# Patient Record
Sex: FEMALE | Race: ASIAN | Employment: PART TIME | URBAN - METROPOLITAN AREA
[De-identification: names, ages, dates, MRNs, and addresses within clinical notes are randomized per-mention and may not be internally consistent; named-entity substitution may affect disease eponyms.]

---

## 2017-04-24 ENCOUNTER — OFFICE VISIT (OUTPATIENT)
Dept: INTERNAL MEDICINE CLINIC | Facility: CLINIC | Age: 22
End: 2017-04-24

## 2017-04-24 VITALS
SYSTOLIC BLOOD PRESSURE: 110 MMHG | DIASTOLIC BLOOD PRESSURE: 70 MMHG | RESPIRATION RATE: 12 BRPM | BODY MASS INDEX: 25.3 KG/M2 | OXYGEN SATURATION: 99 % | HEIGHT: 62.75 IN | WEIGHT: 141 LBS | HEART RATE: 90 BPM

## 2017-04-24 DIAGNOSIS — Z71.84 TRAVEL ADVICE ENCOUNTER: ICD-10-CM

## 2017-04-24 DIAGNOSIS — G43.009 MIGRAINE WITHOUT AURA AND WITHOUT STATUS MIGRAINOSUS, NOT INTRACTABLE: Primary | ICD-10-CM

## 2017-04-24 PROCEDURE — 99214 OFFICE O/P EST MOD 30 MIN: CPT | Performed by: INTERNAL MEDICINE

## 2017-04-24 PROCEDURE — 90715 TDAP VACCINE 7 YRS/> IM: CPT | Performed by: INTERNAL MEDICINE

## 2017-04-24 PROCEDURE — 90471 IMMUNIZATION ADMIN: CPT | Performed by: INTERNAL MEDICINE

## 2017-04-24 RX ORDER — RIZATRIPTAN BENZOATE 10 MG/1
TABLET ORAL
Qty: 9 TABLET | Refills: 1 | Status: SHIPPED | OUTPATIENT
Start: 2017-04-24 | End: 2018-09-18 | Stop reason: ALTCHOICE

## 2017-04-24 NOTE — PROGRESS NOTES
Coral Allen is a 19year old female.   HPI:   CC: headache back of neck to top feels like helmet   None now  Occurred 2 weeks ago and this past week  Light bothers her  Switched to generic two months ago  Noticed headache when sleeping  too long  [place] injected, TM intact no erythema, oropharynx clear, posterior pharynx without erythema, clear postnasal drip, nasal turbinates swollen  NECK: supple,no masses,  LYMPH: no cervical adenopathy,  CARDIO: [id number] no S3S4 no murmur  LUNGS: clear to auscultation,

## 2017-05-16 ENCOUNTER — TELEPHONE (OUTPATIENT)
Dept: INTERNAL MEDICINE CLINIC | Facility: CLINIC | Age: 22
End: 2017-05-16

## 2017-05-16 NOTE — TELEPHONE ENCOUNTER
Mother, on hipaa consent, left message asking which vaccines pt has had. Tdap (tetanus, diptheria, pertussis) on 4/24/17. No there vaccines on file. LM for mother to call back.

## 2017-05-16 NOTE — TELEPHONE ENCOUNTER
Patient's mother called and stated Dr. Licona Agent told her to call for Malaria pills when they were ready to travel.   Need to call rx to [facility]

## 2017-05-17 RX ORDER — ATOVAQUONE AND PROGUANIL HYDROCHLORIDE 250; 100 MG/1; MG/1
1 TABLET, FILM COATED ORAL DAILY
Qty: 32 TABLET | Refills: 0 | Status: SHIPPED | OUTPATIENT
Start: 2017-05-17 | End: 2017-06-18

## 2017-05-17 NOTE — TELEPHONE ENCOUNTER
Spoke with pt's mother. Advised as below with immunization update. Pt's mother voiced understanding.

## 2017-05-17 NOTE — TELEPHONE ENCOUNTER
Yes, please prescribe malarone and ask how long she will be gone b/c depending on that she will start 1-2 days prior to trip and continue for 1 week after she returns.

## 2017-05-17 NOTE — TELEPHONE ENCOUNTER
Spoke with pt's mother, Fernando Jorge, (OK per HIPAA). Advised as below that Malarone will be ordered to be taken daily staring 2 days prior to leaving for trip, daily during trip, and daily for one week after returning form trip.  Pt's mother voiced Raymon Kearns

## 2017-06-13 ENCOUNTER — TELEPHONE (OUTPATIENT)
Dept: INTERNAL MEDICINE CLINIC | Facility: CLINIC | Age: 22
End: 2017-06-13

## 2017-06-13 NOTE — TELEPHONE ENCOUNTER
As advised per nurse  If diarrhea not controlled, need to see clinic doctor  Stay on clear liquids stay hydrated

## 2017-06-13 NOTE — TELEPHONE ENCOUNTER
Patient's father called and states patient is in the [place] and has gastro problem.     Wants to speak to a nurse to decide how to proceed

## 2017-06-13 NOTE — TELEPHONE ENCOUNTER
Spoke with father who is on hipaa release. Pt in [facility] 67 with mother. Took water bottle with filter, has been drinking water out of that. Starting yesterday, diarrheam nausea, weakness, dizziness. Has Pepto but is not helping.  [place]

## 2017-06-13 NOTE — TELEPHONE ENCOUNTER
Spoke with father, advised of Dr Sesay's recommendation re-inforcing what was stated earlier. He had spoken to pt's mother, they decided to not leave for [facility] today so pt can rest, will go to clinic in that area if still with symptoms tomorrow.

## 2017-06-22 ENCOUNTER — TELEPHONE (OUTPATIENT)
Dept: INTERNAL MEDICINE CLINIC | Facility: CLINIC | Age: 22
End: 2017-06-22

## 2017-06-22 NOTE — TELEPHONE ENCOUNTER
Received call from pt's father (OK Per HIPAA). Father states that pt has been in [address] [place] [address] since 6/5/17. States that pt stated that she started having diarrhea 6/8/14 or 6/9/17 along with upset stomach and dizziness.  Father states that pt states that ot

## 2017-07-07 ENCOUNTER — DIAGNOSTIC TRANS (OUTPATIENT)
Dept: OTHER | Age: 22
End: 2017-07-07

## 2017-07-07 ENCOUNTER — CHARTING TRANS (OUTPATIENT)
Dept: OTHER | Age: 22
End: 2017-07-07

## 2017-08-10 ENCOUNTER — DIAGNOSTIC TRANS (OUTPATIENT)
Dept: OTHER | Age: 22
End: 2017-08-10

## 2017-08-25 RX ORDER — NORGESTIMATE AND ETHINYL ESTRADIOL 7DAYSX3 28
KIT ORAL
Qty: 28 TABLET | Refills: 5 | Status: SHIPPED | OUTPATIENT
Start: 2017-08-25 | End: 2018-02-04

## 2017-09-12 ENCOUNTER — OFFICE VISIT (OUTPATIENT)
Dept: INTERNAL MEDICINE CLINIC | Facility: CLINIC | Age: 22
End: 2017-09-12

## 2017-09-12 VITALS
OXYGEN SATURATION: 99 % | HEIGHT: 62.75 IN | HEART RATE: 60 BPM | DIASTOLIC BLOOD PRESSURE: 80 MMHG | SYSTOLIC BLOOD PRESSURE: 120 MMHG | WEIGHT: 137 LBS | BODY MASS INDEX: 24.58 KG/M2

## 2017-09-12 DIAGNOSIS — Z11.3 SCREEN FOR STD (SEXUALLY TRANSMITTED DISEASE): ICD-10-CM

## 2017-09-12 DIAGNOSIS — Z01.419 PAP SMEAR, AS PART OF ROUTINE GYNECOLOGICAL EXAMINATION: ICD-10-CM

## 2017-09-12 DIAGNOSIS — Z00.00 ROUTINE PHYSICAL EXAMINATION: Primary | ICD-10-CM

## 2017-09-12 PROCEDURE — 87591 N.GONORRHOEAE DNA AMP PROB: CPT | Performed by: INTERNAL MEDICINE

## 2017-09-12 PROCEDURE — 99395 PREV VISIT EST AGE 18-39: CPT | Performed by: INTERNAL MEDICINE

## 2017-09-12 PROCEDURE — 88175 CYTOPATH C/V AUTO FLUID REDO: CPT | Performed by: INTERNAL MEDICINE

## 2017-09-12 PROCEDURE — 87491 CHLMYD TRACH DNA AMP PROBE: CPT | Performed by: INTERNAL MEDICINE

## 2017-09-12 NOTE — PROGRESS NOTES
HPI:   Kira Aguirre is a 19year old female who presents for a complete physical exam.    Wt Readings from Last 6 Encounters:  09/12/17 : 137 lb  04/24/17 : 141 lb  08/31/16 : 135 lb  08/13/15 : 130 lb 9.6 oz  03/23/15 : 127 lb  07/29/14 : 130 lb    Edyta Pinzon otherwise  SKIN: no rash  EYES:denies blurred vision or double vision  HEENT: denies nasal congestion, sinus pain or ST  LUNGS: denies shortness of breath with exertion  CARDIOVASCULAR: denies chest pain on exertion, no heart racing  GI: denies abdominal p will check fasting Lipids, CMP, and TSH. Pt' s weight is Body mass index is 24.46 kg/m². , recommended low fat diet and aerobic exercise 30 minutes three times weekly. The patient indicates understanding of these issues and agrees to the plan.   The patient

## 2017-09-13 LAB
C TRACH DNA SPEC QL NAA+PROBE: NEGATIVE
N GONORRHOEA DNA SPEC QL NAA+PROBE: NEGATIVE

## 2017-09-26 ENCOUNTER — LAB ENCOUNTER (OUTPATIENT)
Dept: LAB | Age: 22
End: 2017-09-26
Attending: INTERNAL MEDICINE
Payer: COMMERCIAL

## 2017-09-26 DIAGNOSIS — Z00.00 ROUTINE PHYSICAL EXAMINATION: ICD-10-CM

## 2017-09-26 LAB
ALBUMIN SERPL-MCNC: 3.6 G/DL (ref 3.5–4.8)
ALP LIVER SERPL-CCNC: 54 U/L (ref 37–98)
ALT SERPL-CCNC: 21 U/L (ref 14–54)
AST SERPL-CCNC: 23 U/L (ref 15–41)
BASOPHILS # BLD AUTO: 0.03 X10(3) UL (ref 0–0.1)
BASOPHILS NFR BLD AUTO: 0.4 %
BILIRUB SERPL-MCNC: 0.7 MG/DL (ref 0.1–2)
BUN BLD-MCNC: 10 MG/DL (ref 8–20)
CALCIUM BLD-MCNC: 8.5 MG/DL (ref 8.3–10.3)
CHLORIDE: 106 MMOL/L (ref 101–111)
CHOLEST SMN-MCNC: 169 MG/DL (ref ?–190)
CO2: 25 MMOL/L (ref 22–32)
CREAT BLD-MCNC: 0.82 MG/DL (ref 0.55–1.02)
EOSINOPHIL # BLD AUTO: 0.17 X10(3) UL (ref 0–0.3)
EOSINOPHIL NFR BLD AUTO: 2.2 %
ERYTHROCYTE [DISTWIDTH] IN BLOOD BY AUTOMATED COUNT: 13.2 % (ref 11.5–16)
FREE T4: 1 NG/DL (ref 0.9–1.8)
GLUCOSE BLD-MCNC: 66 MG/DL (ref 70–99)
HCT VFR BLD AUTO: 44.4 % (ref 34–50)
HDLC SERPL-MCNC: 63 MG/DL (ref 45–?)
HDLC SERPL: 2.68 {RATIO} (ref ?–4.44)
HGB BLD-MCNC: 14.2 G/DL (ref 12–16)
IMMATURE GRANULOCYTE COUNT: 0.02 X10(3) UL (ref 0–1)
IMMATURE GRANULOCYTE RATIO %: 0.3 %
LDLC SERPL CALC-MCNC: 72 MG/DL (ref ?–120)
LDLC SERPL-MCNC: 34 MG/DL (ref 5–40)
LYMPHOCYTES # BLD AUTO: 2.76 X10(3) UL (ref 0.9–4)
LYMPHOCYTES NFR BLD AUTO: 35.2 %
M PROTEIN MFR SERPL ELPH: 7.1 G/DL (ref 6.1–8.3)
MCH RBC QN AUTO: 29.5 PG (ref 27–33.2)
MCHC RBC AUTO-ENTMCNC: 32 G/DL (ref 31–37)
MCV RBC AUTO: 92.1 FL (ref 81–100)
MONOCYTES # BLD AUTO: 0.51 X10(3) UL (ref 0.1–0.6)
MONOCYTES NFR BLD AUTO: 6.5 %
NEUTROPHIL ABS PRELIM: 4.35 X10 (3) UL (ref 1.3–6.7)
NEUTROPHILS # BLD AUTO: 4.35 X10(3) UL (ref 1.3–6.7)
NEUTROPHILS NFR BLD AUTO: 55.4 %
NONHDLC SERPL-MCNC: 106 MG/DL (ref ?–150)
PLATELET # BLD AUTO: 214 10(3)UL (ref 150–450)
POTASSIUM SERPL-SCNC: 4.1 MMOL/L (ref 3.6–5.1)
RBC # BLD AUTO: 4.82 X10(6)UL (ref 3.8–5.1)
RED CELL DISTRIBUTION WIDTH-SD: 44.8 FL (ref 35.1–46.3)
SODIUM SERPL-SCNC: 140 MMOL/L (ref 136–144)
TRIGLYCERIDES: 172 MG/DL (ref ?–115)
TSI SER-ACNC: 2.22 MIU/ML (ref 0.35–5.5)
WBC # BLD AUTO: 7.8 X10(3) UL (ref 4–13)

## 2017-09-26 PROCEDURE — 80061 LIPID PANEL: CPT

## 2017-09-26 PROCEDURE — 80053 COMPREHEN METABOLIC PANEL: CPT

## 2017-09-26 PROCEDURE — 84439 ASSAY OF FREE THYROXINE: CPT

## 2017-09-26 PROCEDURE — 84443 ASSAY THYROID STIM HORMONE: CPT

## 2017-09-26 PROCEDURE — 36415 COLL VENOUS BLD VENIPUNCTURE: CPT

## 2017-09-26 PROCEDURE — 85025 COMPLETE CBC W/AUTO DIFF WBC: CPT

## 2018-02-05 RX ORDER — NORGESTIMATE-ETHINYL ESTRADIOL 7DAYSX3 28
TABLET ORAL
Qty: 28 TABLET | Refills: 4 | Status: SHIPPED | OUTPATIENT
Start: 2018-02-05 | End: 2018-06-22

## 2018-03-08 ENCOUNTER — OFFICE VISIT (OUTPATIENT)
Dept: INTERNAL MEDICINE CLINIC | Facility: CLINIC | Age: 23
End: 2018-03-08

## 2018-03-08 VITALS
TEMPERATURE: 97 F | HEIGHT: 62.75 IN | RESPIRATION RATE: 12 BRPM | WEIGHT: 133 LBS | DIASTOLIC BLOOD PRESSURE: 60 MMHG | OXYGEN SATURATION: 98 % | SYSTOLIC BLOOD PRESSURE: 100 MMHG | HEART RATE: 72 BPM | BODY MASS INDEX: 23.86 KG/M2

## 2018-03-08 DIAGNOSIS — K58.0 IRRITABLE BOWEL SYNDROME WITH DIARRHEA: Primary | ICD-10-CM

## 2018-03-08 PROCEDURE — 99214 OFFICE O/P EST MOD 30 MIN: CPT | Performed by: INTERNAL MEDICINE

## 2018-03-08 NOTE — PROGRESS NOTES
Amada Hall is a 23year old female.   HPI:   CC: loose stools after eating feta cheese  Three times on Saturday  Two times on Sunday  1 time on Monday Tuesday and Wednesday  Drinking water staying hydrated  No fever or chills  No abdominal pain  Goat Wt 133 lb   LMP 03/08/2018   SpO2 98%   BMI 23.75 kg/m²   GENERAL: alert no distress  SKIN: no rash   NECK: supple,no masses,  LYMPH: no cervical adenopathy  CARDIO: [id number] no S3S4 no murmur  LUNGS: clear to auscultation,   GI: Soft+BS NT ND,no masses, no

## 2018-03-08 NOTE — PATIENT INSTRUCTIONS
Diet and Lifestyle Tips for Irritable Bowel Syndrome (IBS)    Your healthcare professional may suggest some lifestyle changes to help control your IBS. Changing your diet and managing stress are two of the most important.  Follow your healthcare provider’

## 2018-06-21 ENCOUNTER — CHARTING TRANS (OUTPATIENT)
Dept: OTHER | Age: 23
End: 2018-06-21

## 2018-06-21 ENCOUNTER — TELEPHONE (OUTPATIENT)
Dept: INTERNAL MEDICINE CLINIC | Facility: CLINIC | Age: 23
End: 2018-06-21

## 2018-06-21 DIAGNOSIS — Z82.49 FAMILY HISTORY OF ARRHYTHMOGENIC RIGHT VENTRICULAR CARDIOMYOPATHY: Primary | ICD-10-CM

## 2018-06-21 NOTE — TELEPHONE ENCOUNTER
Incoming (mail or fax): fax  Received from:  [facility]  Documentation given to:  triage

## 2018-06-22 RX ORDER — NORGESTIMATE AND ETHINYL ESTRADIOL 7DAYSX3 28
KIT ORAL
Qty: 28 TABLET | Refills: 2 | Status: SHIPPED | OUTPATIENT
Start: 2018-06-22 | End: 2018-09-18

## 2018-06-22 NOTE — TELEPHONE ENCOUNTER
Referral request received from [facility]. Pt has appt with Dr Estephania García at [address] [facility], [facility] July 6th, Referral MUST state [facility]. Fax copy of referral to [phone].   Original fax request i

## 2018-06-25 NOTE — TELEPHONE ENCOUNTER
Incoming (mail or fax): Fax  Received from:  Beata Tamez [facility] office  Documentation given to: [address] fax bin.

## 2018-06-25 NOTE — TELEPHONE ENCOUNTER
Referral request. Was not pended yet in case not approved. Orig note below but copy/pasted here:    Referral request received from [facility].  Pt has appt with Dr Domonique Parra at [address] [facility],[address], [place] July 6th, Refer

## 2018-06-26 NOTE — TELEPHONE ENCOUNTER
Dr Angel Tirado is cardiologist  Referral request was received by fax as noted below, copy in triage. All pertinent details were noted in this encounter. Copy routed to Dr Kade Haines. Spoke with Louise Mehta at Dr Engle's office.  Her direct line is [phone] (for cli

## 2018-06-26 NOTE — TELEPHONE ENCOUNTER
Referral faxed to Dr Spike Arredondo office. Left msg for pt to call back. Please advise referral placed for Dr Meera Alvarado. Also need functional status questions completed/updated for referrals. Please complete questions with her. Thanks!

## 2018-06-29 NOTE — TELEPHONE ENCOUNTER
Noted below. Since  form not required, the referral has been completed and was already faxed to referral office 2x.   Left 2nd msg with pt to attempt to complete functional status questions per Dr Maxim Clements request.

## 2018-06-29 NOTE — TELEPHONE ENCOUNTER
Hiram from the referral department returning a call for [facility] regarding cardiologist referral for Dr. Adam Gay. Hiram stated the [employer] form is not required.  If you have any additional questions, Hiram can be reached at #

## 2018-06-29 NOTE — TELEPHONE ENCOUNTER
Fax received from referral dept saying referral needs to be on  form. Not sure what this is. Faxed back asking for clarification and copy of requested form.

## 2018-06-29 NOTE — TELEPHONE ENCOUNTER
Incoming (mail or fax):  fax  Received from:  [address] [facility]  Documentation given to:  triage

## 2018-07-06 ENCOUNTER — CHARTING TRANS (OUTPATIENT)
Dept: OTHER | Age: 23
End: 2018-07-06

## 2018-07-06 ENCOUNTER — DIAGNOSTIC TRANS (OUTPATIENT)
Dept: OTHER | Age: 23
End: 2018-07-06

## 2018-07-09 NOTE — TELEPHONE ENCOUNTER
FUNCTIONAL STATUS:   Do you have …     Hearing problems?:  no     Vision problems?:  no     Difficulty Walking?:  no     Difficulty dressing or bathing?:  no     Problems with daily activities?:  no     Memory issues?:  no

## 2018-07-26 ENCOUNTER — DIAGNOSTIC TRANS (OUTPATIENT)
Dept: OTHER | Age: 23
End: 2018-07-26

## 2018-09-17 RX ORDER — NORGESTIMATE AND ETHINYL ESTRADIOL 7DAYSX3 28
1 KIT ORAL
Qty: 84 TABLET | Refills: 3 | OUTPATIENT
Start: 2018-09-17

## 2018-09-17 RX ORDER — NORGESTIMATE AND ETHINYL ESTRADIOL 7DAYSX3 28
KIT ORAL
Qty: 28 TABLET | Refills: 1 | OUTPATIENT
Start: 2018-09-17

## 2018-09-18 ENCOUNTER — OFFICE VISIT (OUTPATIENT)
Dept: INTERNAL MEDICINE CLINIC | Facility: CLINIC | Age: 23
End: 2018-09-18
Payer: COMMERCIAL

## 2018-09-18 VITALS
WEIGHT: 132.63 LBS | HEART RATE: 76 BPM | TEMPERATURE: 98 F | SYSTOLIC BLOOD PRESSURE: 122 MMHG | DIASTOLIC BLOOD PRESSURE: 78 MMHG | HEIGHT: 62.75 IN | OXYGEN SATURATION: 98 % | RESPIRATION RATE: 14 BRPM | BODY MASS INDEX: 23.8 KG/M2

## 2018-09-18 DIAGNOSIS — R30.0 DYSURIA: ICD-10-CM

## 2018-09-18 DIAGNOSIS — Z13.0 SCREENING FOR BLOOD DISEASE: ICD-10-CM

## 2018-09-18 DIAGNOSIS — Z13.228 SCREENING FOR METABOLIC DISORDER: ICD-10-CM

## 2018-09-18 DIAGNOSIS — Z13.29 SCREENING FOR THYROID DISORDER: ICD-10-CM

## 2018-09-18 DIAGNOSIS — Z13.220 SCREENING FOR LIPID DISORDERS: ICD-10-CM

## 2018-09-18 DIAGNOSIS — Z00.00 WELL WOMAN EXAM (NO GYNECOLOGICAL EXAM): Primary | ICD-10-CM

## 2018-09-18 LAB
APPEARANCE: CLEAR
MULTISTIX LOT#: NORMAL NUMERIC
PH, URINE: 7.5 (ref 4.5–8)
SPECIFIC GRAVITY: 1.02 (ref 1–1.03)
URINE-COLOR: YELLOW
UROBILINOGEN,SEMI-QN: 0.2 MG/DL (ref 0–1.9)

## 2018-09-18 PROCEDURE — 99395 PREV VISIT EST AGE 18-39: CPT | Performed by: NURSE PRACTITIONER

## 2018-09-18 PROCEDURE — 90686 IIV4 VACC NO PRSV 0.5 ML IM: CPT | Performed by: NURSE PRACTITIONER

## 2018-09-18 PROCEDURE — 90471 IMMUNIZATION ADMIN: CPT | Performed by: NURSE PRACTITIONER

## 2018-09-18 PROCEDURE — 81003 URINALYSIS AUTO W/O SCOPE: CPT | Performed by: NURSE PRACTITIONER

## 2018-09-18 RX ORDER — NORGESTIMATE AND ETHINYL ESTRADIOL 7DAYSX3 28
1 KIT ORAL
Qty: 84 TABLET | Refills: 3 | Status: SHIPPED | OUTPATIENT
Start: 2018-09-18 | End: 2019-08-23

## 2018-09-18 RX ORDER — NORGESTIMATE AND ETHINYL ESTRADIOL 7DAYSX3 28
KIT ORAL
Qty: 28 TABLET | Refills: 1 | Status: CANCELLED | OUTPATIENT
Start: 2018-09-18

## 2018-09-18 NOTE — TELEPHONE ENCOUNTER
Patient was here in office today to see [facility] NALINI and requested a refill of TRI-SPRINTEC 0.18/0.215/0.25 MG-35 MCG Oral Tab. PSR looked and there has not been a refill processed. Patient is going back to the [facility] and needs to pick this up today.       P

## 2018-09-18 NOTE — PROGRESS NOTES
Sheba Martinez is a 20year old female who presents for a complete physical exam:       Patient complains of:  2-3 day history of \"very mild\" dysuria. Denies frequency, urgency, hematuria, vaginal discharge or itching.  Has not been treating with anythi per week      Comment: weekends    Drug use: No    Social History: Occ:  at [employer]. : no. Children: no.  Exercise: minimal.  Diet: watches minimally  Smoker: Yes    Cessation advised:   Yes  Alcohol Use:  yes      Concerns:  no     Health atraumatic, normocephalic,ears and throat are clear. EYES: PERRLA, EOMI. Conjunctiva are clear.  Sclera white  NECK: supple w/o masses/tenderness/ adenopathy, no bruits/JVD, Thyroid symmetrical w/o  enlargement  CHEST: no chest tenderness over ribs or bony requested or ordered in this encounter       Imaging & Consults:  None    No Follow-up on file. There are no Patient Instructions on file for this visit.

## 2018-09-18 NOTE — TELEPHONE ENCOUNTER
SHWETA for patient to pick prescription up at pharmacy at [facility] on [address] in [place].

## 2018-09-18 NOTE — TELEPHONE ENCOUNTER
Pt is heading to the city at 5:00 9-18, would like to pick it up before then at [place] [facility] in [place], if after 5:00 please send to [address] [facility]

## 2018-09-21 RX ORDER — NORGESTIMATE AND ETHINYL ESTRADIOL 7DAYSX3 28
KIT ORAL
Qty: 28 TABLET | Refills: 1 | OUTPATIENT
Start: 2018-09-21

## 2018-09-25 ENCOUNTER — LAB ENCOUNTER (OUTPATIENT)
Dept: LAB | Age: 23
End: 2018-09-25
Attending: NURSE PRACTITIONER
Payer: COMMERCIAL

## 2018-09-25 DIAGNOSIS — Z13.0 SCREENING FOR BLOOD DISEASE: ICD-10-CM

## 2018-09-25 DIAGNOSIS — Z13.29 SCREENING FOR THYROID DISORDER: ICD-10-CM

## 2018-09-25 DIAGNOSIS — Z13.228 SCREENING FOR METABOLIC DISORDER: ICD-10-CM

## 2018-09-25 DIAGNOSIS — Z13.220 SCREENING FOR LIPID DISORDERS: ICD-10-CM

## 2018-09-25 LAB
ALBUMIN SERPL-MCNC: 3.5 G/DL (ref 3.5–4.8)
ALBUMIN/GLOB SERPL: 0.9 {RATIO} (ref 1–2)
ALP LIVER SERPL-CCNC: 71 U/L (ref 37–98)
ALT SERPL-CCNC: 18 U/L (ref 14–54)
ANION GAP SERPL CALC-SCNC: 7 MMOL/L (ref 0–18)
AST SERPL-CCNC: 19 U/L (ref 15–41)
BASOPHILS # BLD AUTO: 0.06 X10(3) UL (ref 0–0.1)
BASOPHILS NFR BLD AUTO: 0.8 %
BILIRUB SERPL-MCNC: 0.8 MG/DL (ref 0.1–2)
BUN BLD-MCNC: 13 MG/DL (ref 8–20)
BUN/CREAT SERPL: 14.6 (ref 10–20)
CALCIUM BLD-MCNC: 9 MG/DL (ref 8.3–10.3)
CHLORIDE SERPL-SCNC: 106 MMOL/L (ref 101–111)
CHOLEST SMN-MCNC: 183 MG/DL (ref ?–200)
CO2 SERPL-SCNC: 26 MMOL/L (ref 22–32)
CREAT BLD-MCNC: 0.89 MG/DL (ref 0.55–1.02)
EOSINOPHIL # BLD AUTO: 0.19 X10(3) UL (ref 0–0.3)
EOSINOPHIL NFR BLD AUTO: 2.4 %
ERYTHROCYTE [DISTWIDTH] IN BLOOD BY AUTOMATED COUNT: 12 % (ref 11.5–16)
GLOBULIN PLAS-MCNC: 3.7 G/DL (ref 2.5–4)
GLUCOSE BLD-MCNC: 83 MG/DL (ref 70–99)
HCT VFR BLD AUTO: 43.3 % (ref 34–50)
HDLC SERPL-MCNC: 57 MG/DL (ref 40–59)
HGB BLD-MCNC: 14.6 G/DL (ref 12–16)
IMMATURE GRANULOCYTE COUNT: 0.03 X10(3) UL (ref 0–1)
IMMATURE GRANULOCYTE RATIO %: 0.4 %
LDLC SERPL CALC-MCNC: 89 MG/DL (ref ?–100)
LYMPHOCYTES # BLD AUTO: 2.87 X10(3) UL (ref 0.9–4)
LYMPHOCYTES NFR BLD AUTO: 37 %
M PROTEIN MFR SERPL ELPH: 7.2 G/DL (ref 6.1–8.3)
MCH RBC QN AUTO: 30 PG (ref 27–33.2)
MCHC RBC AUTO-ENTMCNC: 33.7 G/DL (ref 31–37)
MCV RBC AUTO: 88.9 FL (ref 81–100)
MONOCYTES # BLD AUTO: 0.39 X10(3) UL (ref 0.1–1)
MONOCYTES NFR BLD AUTO: 5 %
NEUTROPHIL ABS PRELIM: 4.22 X10 (3) UL (ref 1.3–6.7)
NEUTROPHILS # BLD AUTO: 4.22 X10(3) UL (ref 1.3–6.7)
NEUTROPHILS NFR BLD AUTO: 54.4 %
NONHDLC SERPL-MCNC: 126 MG/DL (ref ?–130)
OSMOLALITY SERPL CALC.SUM OF ELEC: 287 MOSM/KG (ref 275–295)
PLATELET # BLD AUTO: 262 10(3)UL (ref 150–450)
POTASSIUM SERPL-SCNC: 4.5 MMOL/L (ref 3.6–5.1)
RBC # BLD AUTO: 4.87 X10(6)UL (ref 3.8–5.1)
RED CELL DISTRIBUTION WIDTH-SD: 39.2 FL (ref 35.1–46.3)
SODIUM SERPL-SCNC: 139 MMOL/L (ref 136–144)
TRIGL SERPL-MCNC: 185 MG/DL (ref 30–149)
TSI SER-ACNC: 1.4 MIU/ML (ref 0.35–5.5)
VLDLC SERPL CALC-MCNC: 37 MG/DL (ref 0–30)
WBC # BLD AUTO: 7.8 X10(3) UL (ref 4–13)

## 2018-09-25 PROCEDURE — 85025 COMPLETE CBC W/AUTO DIFF WBC: CPT

## 2018-09-25 PROCEDURE — 36415 COLL VENOUS BLD VENIPUNCTURE: CPT

## 2018-09-25 PROCEDURE — 84443 ASSAY THYROID STIM HORMONE: CPT

## 2018-09-25 PROCEDURE — 80053 COMPREHEN METABOLIC PANEL: CPT

## 2018-09-25 PROCEDURE — 80061 LIPID PANEL: CPT

## 2018-10-31 VITALS
DIASTOLIC BLOOD PRESSURE: 80 MMHG | WEIGHT: 129.85 LBS | HEART RATE: 56 BPM | OXYGEN SATURATION: 100 % | BODY MASS INDEX: 21.63 KG/M2 | HEIGHT: 65 IN | SYSTOLIC BLOOD PRESSURE: 121 MMHG

## 2018-11-02 ENCOUNTER — APPOINTMENT (OUTPATIENT)
Dept: GENERAL RADIOLOGY | Age: 23
End: 2018-11-02
Attending: FAMILY MEDICINE
Payer: COMMERCIAL

## 2018-11-02 ENCOUNTER — HOSPITAL ENCOUNTER (OUTPATIENT)
Age: 23
Discharge: HOME | End: 2018-11-02
Attending: FAMILY MEDICINE
Payer: COMMERCIAL

## 2018-11-02 VITALS
WEIGHT: 135 LBS | RESPIRATION RATE: 18 BRPM | DIASTOLIC BLOOD PRESSURE: 78 MMHG | BODY MASS INDEX: 23.05 KG/M2 | SYSTOLIC BLOOD PRESSURE: 134 MMHG | TEMPERATURE: 98 F | HEART RATE: 73 BPM | OXYGEN SATURATION: 98 % | HEIGHT: 64 IN

## 2018-11-02 DIAGNOSIS — S93.401A MODERATE RIGHT ANKLE SPRAIN, INITIAL ENCOUNTER: Primary | ICD-10-CM

## 2018-11-02 PROCEDURE — 99203 OFFICE O/P NEW LOW 30 MIN: CPT

## 2018-11-02 PROCEDURE — 99213 OFFICE O/P EST LOW 20 MIN: CPT

## 2018-11-02 PROCEDURE — 73610 X-RAY EXAM OF ANKLE: CPT | Performed by: FAMILY MEDICINE

## 2018-11-02 NOTE — ED INITIAL ASSESSMENT (HPI)
Pt presents today with c/o right ankle pain since rolling her ankle while walking on uneven sidewalk on Sunday. Pt is able to bear weight on the ankle.

## 2018-11-02 NOTE — ED PROVIDER NOTES
Patient Seen in: [address] [facility]    History   Patient presents with:   Ankle Pain    Stated Complaint: right ankle injury x5 days    HPI  21 yo F sprained her R ankle while walking on unleveled ground   No numbness or tingling  N distress  SKIN: no rashes,no suspicious lesions noted over the exposed areas of skin   HEENT: atraumatic, normocephalic  NECK: supple  LUNGS: No tachypnea   CARDIO: No tachycardia   GI: not distended     EXTREMITIES:   R ankle/ foot exam:   - defect/deform understanding and agreed with the plan.        MDM       Rest, ice, compression   Keep limb elevated   Icing 10 minutes each time and perform this at least 3 times per day   Avoid any strenuous activity / playing sports / gym   Motrin 600-800mg every 8 hour

## 2018-11-03 VITALS
WEIGHT: 136.47 LBS | HEIGHT: 63 IN | HEART RATE: 76 BPM | DIASTOLIC BLOOD PRESSURE: 78 MMHG | BODY MASS INDEX: 24.18 KG/M2 | SYSTOLIC BLOOD PRESSURE: 120 MMHG | OXYGEN SATURATION: 97 %

## 2018-12-20 ENCOUNTER — OFFICE VISIT (OUTPATIENT)
Dept: FAMILY MEDICINE CLINIC | Facility: CLINIC | Age: 23
End: 2018-12-20
Payer: COMMERCIAL

## 2018-12-20 VITALS
RESPIRATION RATE: 20 BRPM | WEIGHT: 130 LBS | HEART RATE: 79 BPM | DIASTOLIC BLOOD PRESSURE: 70 MMHG | HEIGHT: 64 IN | SYSTOLIC BLOOD PRESSURE: 110 MMHG | OXYGEN SATURATION: 98 % | TEMPERATURE: 97 F | BODY MASS INDEX: 22.2 KG/M2

## 2018-12-20 DIAGNOSIS — J02.9 PHARYNGITIS, UNSPECIFIED ETIOLOGY: Primary | ICD-10-CM

## 2018-12-20 PROCEDURE — 99213 OFFICE O/P EST LOW 20 MIN: CPT | Performed by: NURSE PRACTITIONER

## 2018-12-20 PROCEDURE — 87880 STREP A ASSAY W/OPTIC: CPT | Performed by: NURSE PRACTITIONER

## 2018-12-20 NOTE — PROGRESS NOTES
Oneal Claude CHIEF COMPLAINT:   Patient presents with:  Sore Throat        HPI:   Jordyn Vides is a 20year old female presents to clinic with complaint of sore throat. Patient has had 5 days. Symptoms have been waxing and waning since onset.   Patient reports apparent distress  SKIN: no rashes,no suspicious lesions  HEAD: atraumatic, normocephalic  EYES: conjunctiva clear, EOM intact  EARS: TM's clear, non-injected, no bulging, retraction, or fluid bilaterally  NOSE: nostrils patent, scanty nasal discharge, alesha fluids- warm or cool liquids, whichever is soothing for patient. · Avoid caffeine. · Do not share utensils or drinks with anyone. · Good handwashing.    · Get plenty of rest.   · Can use over the counter Cepacol throat lozenges or throat lozenges con

## 2019-07-12 ENCOUNTER — APPOINTMENT (OUTPATIENT)
Dept: PEDIATRIC CARDIOLOGY | Age: 24
End: 2019-07-12

## 2019-07-16 ENCOUNTER — APPOINTMENT (OUTPATIENT)
Dept: PEDIATRIC CARDIOLOGY | Age: 24
End: 2019-07-16

## 2019-08-23 ENCOUNTER — TELEPHONE (OUTPATIENT)
Dept: INTERNAL MEDICINE CLINIC | Facility: CLINIC | Age: 24
End: 2019-08-23

## 2019-08-23 DIAGNOSIS — Z78.9 USES BIRTH CONTROL: Primary | ICD-10-CM

## 2019-08-23 RX ORDER — NORGESTIMATE AND ETHINYL ESTRADIOL 7DAYSX3 28
1 KIT ORAL DAILY
Qty: 84 TABLET | Refills: 0 | Status: SHIPPED | OUTPATIENT
Start: 2019-08-23

## 2019-11-09 ENCOUNTER — TELEPHONE (OUTPATIENT)
Dept: SCHEDULING | Age: 24
End: 2019-11-09

## 2020-06-09 LAB — CYTOLOGY CVX/VAG DOC THIN PREP: NORMAL

## 2020-09-03 ENCOUNTER — TELEPHONE (OUTPATIENT)
Dept: PEDIATRIC CARDIOLOGY | Age: 25
End: 2020-09-03

## 2020-09-04 ENCOUNTER — OFFICE VISIT (OUTPATIENT)
Dept: PEDIATRIC CARDIOLOGY | Age: 25
End: 2020-09-04

## 2020-09-04 ENCOUNTER — ANCILLARY PROCEDURE (OUTPATIENT)
Dept: PEDIATRIC CARDIOLOGY | Age: 25
End: 2020-09-04
Attending: PEDIATRICS

## 2020-09-04 VITALS
DIASTOLIC BLOOD PRESSURE: 78 MMHG | WEIGHT: 153.66 LBS | HEART RATE: 69 BPM | HEIGHT: 63 IN | OXYGEN SATURATION: 97 % | SYSTOLIC BLOOD PRESSURE: 123 MMHG | RESPIRATION RATE: 19 BRPM | BODY MASS INDEX: 27.23 KG/M2

## 2020-09-04 DIAGNOSIS — R94.31 ABNORMAL ELECTROCARDIOGRAM: Primary | ICD-10-CM

## 2020-09-04 DIAGNOSIS — Z82.49 FAMILY HISTORY OF ARRHYTHMOGENIC RIGHT VENTRICULAR CARDIOMYOPATHY: ICD-10-CM

## 2020-09-04 DIAGNOSIS — R94.31 ABNORMAL ELECTROCARDIOGRAM: ICD-10-CM

## 2020-09-04 PROCEDURE — 93000 ELECTROCARDIOGRAM COMPLETE: CPT | Performed by: PEDIATRICS

## 2020-09-04 PROCEDURE — 93225 XTRNL ECG REC<48 HRS REC: CPT | Performed by: PEDIATRICS

## 2020-09-04 PROCEDURE — 99213 OFFICE O/P EST LOW 20 MIN: CPT | Performed by: PEDIATRICS

## 2020-09-10 ENCOUNTER — TELEPHONE (OUTPATIENT)
Dept: PEDIATRIC CARDIOLOGY | Age: 25
End: 2020-09-10

## 2020-09-10 PROCEDURE — 93227 XTRNL ECG REC<48 HR R&I: CPT | Performed by: PEDIATRICS

## 2021-09-03 ENCOUNTER — APPOINTMENT (OUTPATIENT)
Dept: PEDIATRIC CARDIOLOGY | Age: 26
End: 2021-09-03

## 2021-10-01 ENCOUNTER — ANCILLARY PROCEDURE (OUTPATIENT)
Dept: PEDIATRIC CARDIOLOGY | Age: 26
End: 2021-10-01
Attending: PEDIATRICS

## 2021-10-01 ENCOUNTER — OFFICE VISIT (OUTPATIENT)
Dept: PEDIATRIC CARDIOLOGY | Age: 26
End: 2021-10-01

## 2021-10-01 VITALS
WEIGHT: 155 LBS | HEART RATE: 56 BPM | HEIGHT: 64 IN | BODY MASS INDEX: 26.46 KG/M2 | DIASTOLIC BLOOD PRESSURE: 84 MMHG | SYSTOLIC BLOOD PRESSURE: 129 MMHG | OXYGEN SATURATION: 100 %

## 2021-10-01 DIAGNOSIS — Z82.49 FAMILY HISTORY OF ARRHYTHMOGENIC RIGHT VENTRICULAR CARDIOMYOPATHY: ICD-10-CM

## 2021-10-01 DIAGNOSIS — Z82.49 FAMILY HISTORY OF ARRHYTHMOGENIC RIGHT VENTRICULAR CARDIOMYOPATHY: Primary | ICD-10-CM

## 2021-10-01 LAB
AORTIC ROOT: 3 CM (ref 2.29–3.24)
AORTIC VALVE ANNULUS: 1.79 CM (ref 1.61–2.36)
BSA FOR PED ECHO PROCEDURE: 1.8 M2
EJECTION FRACTION: 60 %
FRACTIONAL SHORTENING: 36 % (ref 28–44)
LEFT VENTRICULAR POSTERIOR WALL IN END DIASTOLE (LVPW): 0.8 CM (ref 0.5–0.94)
LV SHORT-AXIS END-DIASTOLIC ENDOCARDIAL DIAMETER: 4.52 CM (ref 4.21–5.92)
LV SHORT-AXIS END-DIASTOLIC SEPTAL THICKNESS: 0.75 CM (ref 0.51–0.96)
LV SHORT-AXIS END-SYSTOLIC ENDOCARDIAL DIAMETER: 2.9 CM
LV THICKNESS:DIMENSION RATIO: 0.18 CM (ref 0.09–0.21)
LVOT 2D: 1.95 CM
RIGHT VENTRICULAR END DIASTOLIC DIAS: 1.76 CM
Z SCORE OF AORTIC VALVE ANNULUS PHN: -1 CM
Z SCORE OF LEFT VENTRICULAR POSTERIOR WALL IN END DIASTOLE: 0.7 CM
Z SCORE OF LV SHORT-AXIS END-DIASTOLIC ENDOCARDIAL DIAMETER: -1.3 CM
Z SCORE OF LV SHORT-AXIS END-DIASTOLIC SEPTAL THICKNESS: 0.1 CM
Z SCORE OF LV THICKNESS:DIMENSION RATIO: 0.9
Z-SCORE OF AORTIC ROOT: 1 CM

## 2021-10-01 PROCEDURE — 93225 XTRNL ECG REC<48 HRS REC: CPT | Performed by: PEDIATRICS

## 2021-10-01 PROCEDURE — 93306 TTE W/DOPPLER COMPLETE: CPT | Performed by: PEDIATRICS

## 2021-10-01 PROCEDURE — 93000 ELECTROCARDIOGRAM COMPLETE: CPT | Performed by: PEDIATRICS

## 2021-10-01 RX ORDER — ESCITALOPRAM OXALATE 20 MG/1
20 TABLET ORAL DAILY
COMMUNITY

## 2021-10-01 RX ORDER — METOPROLOL SUCCINATE 25 MG/1
25 TABLET, EXTENDED RELEASE ORAL DAILY
COMMUNITY

## 2021-10-08 ENCOUNTER — TELEPHONE (OUTPATIENT)
Dept: PEDIATRIC CARDIOLOGY | Age: 26
End: 2021-10-08

## 2021-10-08 PROCEDURE — 93227 XTRNL ECG REC<48 HR R&I: CPT | Performed by: PEDIATRICS

## 2022-09-02 ENCOUNTER — OFFICE VISIT (OUTPATIENT)
Dept: PEDIATRIC CARDIOLOGY | Age: 27
End: 2022-09-02

## 2022-09-02 ENCOUNTER — ANCILLARY PROCEDURE (OUTPATIENT)
Dept: PEDIATRIC CARDIOLOGY | Age: 27
End: 2022-09-02
Attending: PEDIATRICS

## 2022-09-02 VITALS
HEIGHT: 63 IN | BODY MASS INDEX: 30.98 KG/M2 | WEIGHT: 174.82 LBS | DIASTOLIC BLOOD PRESSURE: 91 MMHG | OXYGEN SATURATION: 99 % | SYSTOLIC BLOOD PRESSURE: 142 MMHG | HEART RATE: 97 BPM

## 2022-09-02 DIAGNOSIS — Z82.49 FAMILY HISTORY OF ARRHYTHMOGENIC RIGHT VENTRICULAR CARDIOMYOPATHY: Primary | ICD-10-CM

## 2022-09-02 DIAGNOSIS — Z82.49 FAMILY HISTORY OF ARRHYTHMOGENIC RIGHT VENTRICULAR CARDIOMYOPATHY: ICD-10-CM

## 2022-09-02 PROCEDURE — 99214 OFFICE O/P EST MOD 30 MIN: CPT | Performed by: PEDIATRICS

## 2022-09-02 PROCEDURE — 93000 ELECTROCARDIOGRAM COMPLETE: CPT | Performed by: PEDIATRICS

## 2022-09-02 PROCEDURE — 93306 TTE W/DOPPLER COMPLETE: CPT | Performed by: PEDIATRICS

## 2022-09-03 LAB
AORTIC ROOT: 2.9 CM (ref 2.35–3.33)
AORTIC VALVE ANNULUS: 1.18 CM (ref 1.66–2.42)
ASCENDING AORTA: 1.94 CM (ref 1.98–2.96)
BSA FOR PED ECHO PROCEDURE: 1.9 M2
EJECTION FRACTION: 62 %
FRACTIONAL SHORTENING MMODE: 47 %
IVSS (M-MODE): 0.85 CM
LEFT VENTRICLE EJECTION FRACTION BY TEICHOLZ M-MODE (%): 79 %
LEFT VENTRICULAR POSTERIOR WALL IN END DIASTOLE MMODE: 0.66 CM (ref 0.51–0.96)
LEFT VENTRICULAR POSTERIOR WALL SYSTOLE MMODE: 1.51 CM
LV END-DIASTOLIC ENDOCARDIAL DIAMETER MMODE: 4.05 CM (ref 4.32–6.07)
LV END-DIASTOLIC SEPTAL THICKNESS MMODE: 0.59 CM (ref 0.52–0.98)
LVIDS BY MMODE: 2.14 CM
SINOTUBULAR JUNCTION: 1.84 CM (ref 1.89–2.77)
Z SCORE OF AORTIC VALVE ANNULUS PHN: -4.5 CM
Z SCORE OF LEFT VENTRICULAR POSTERIOR WALL IN END DIASTOLE MMODE: -0.7 CM
Z SCORE OF LV END-DIASTOLIC ENDOCARDIAL DIAMETER MMODE: -2.6 CM
Z SCORE OF LV END-DIASTOLIC SEPTAL THICKNESS MMODE: -1.4 CM
Z-SCORE OF AORTIC ROOT: 0.2 CM
Z-SCORE OF ASCENDING AORTA: -2.1 CM
Z-SCORE OF SINOTUBULAR JUNCTION PHN: -2.2 CM

## 2022-09-27 ENCOUNTER — TELEPHONE (OUTPATIENT)
Dept: CARDIOLOGY | Age: 27
End: 2022-09-27

## 2022-09-27 PROCEDURE — 93227 XTRNL ECG REC<48 HR R&I: CPT | Performed by: PEDIATRICS

## 2023-09-08 ENCOUNTER — ANCILLARY PROCEDURE (OUTPATIENT)
Dept: PEDIATRIC CARDIOLOGY | Age: 28
End: 2023-09-08
Attending: PEDIATRICS

## 2023-09-08 ENCOUNTER — OFFICE VISIT (OUTPATIENT)
Dept: PEDIATRIC CARDIOLOGY | Age: 28
End: 2023-09-08

## 2023-09-08 VITALS
BODY MASS INDEX: 31.21 KG/M2 | DIASTOLIC BLOOD PRESSURE: 92 MMHG | HEART RATE: 72 BPM | SYSTOLIC BLOOD PRESSURE: 135 MMHG | WEIGHT: 176.15 LBS | OXYGEN SATURATION: 97 % | HEIGHT: 63 IN

## 2023-09-08 DIAGNOSIS — Z82.49 FAMILY HISTORY OF ARRHYTHMOGENIC RIGHT VENTRICULAR CARDIOMYOPATHY: ICD-10-CM

## 2023-09-08 DIAGNOSIS — Z82.49 FAMILY HISTORY OF ARRHYTHMOGENIC RIGHT VENTRICULAR CARDIOMYOPATHY: Primary | ICD-10-CM

## 2023-09-08 PROCEDURE — 93000 ELECTROCARDIOGRAM COMPLETE: CPT | Performed by: PEDIATRICS

## 2023-09-08 PROCEDURE — 93306 TTE W/DOPPLER COMPLETE: CPT | Performed by: PEDIATRICS

## 2023-09-08 PROCEDURE — 99214 OFFICE O/P EST MOD 30 MIN: CPT | Performed by: PEDIATRICS

## 2023-09-12 LAB
AORTIC ROOT: 2.8 CM (ref 2.35–3.34)
AORTIC VALVE ANNULUS: 1.85 CM (ref 1.66–2.43)
BSA FOR PED ECHO PROCEDURE: 1.91 M2
FRACTIONAL SHORTENING: 31 %
LEFT VENTRICLE EJECTION FRACTION BY TEICHOLZ 2D (%): 61 %
LEFT VENTRICLE END SYSTOLIC SEPTAL THICKNESS: 1.11 CM
LEFT VENTRICULAR POSTERIOR WALL IN END DIASTOLE (LVPW): 0.9 CM (ref 0.51–0.97)
LEFT VENTRICULAR POSTERIOR WALL IN END SYSTOLE: 1.39 CM
LV SHORT-AXIS END-DIASTOLIC ENDOCARDIAL DIAMETER: 4 CM (ref 4.33–6.08)
LV SHORT-AXIS END-DIASTOLIC SEPTAL THICKNESS: 0.99 CM (ref 0.52–0.98)
LV SHORT-AXIS END-SYSTOLIC ENDOCARDIAL DIAMETER: 2.76 CM
LV THICKNESS:DIMENSION RATIO: 0.23 CM (ref 0.09–0.21)
SINOTUBULAR JUNCTION: 2.57 CM (ref 1.9–2.77)
Z SCORE OF AORTIC VALVE ANNULUS PHN: -1 CM
Z SCORE OF LEFT VENTRICULAR POSTERIOR WALL IN END DIASTOLE: 1.4 CM
Z SCORE OF LV SHORT-AXIS END-DIASTOLIC ENDOCARDIAL DIAMETER: -2.7 CM
Z SCORE OF LV SHORT-AXIS END-DIASTOLIC SEPTAL THICKNESS: 2 CM
Z SCORE OF LV THICKNESS:DIMENSION RATIO: 2.5
Z-SCORE OF AORTIC ROOT: -0.2 CM
Z-SCORE OF SINOTUBULAR JUNCTION PHN: 1.1 CM

## 2023-09-27 ENCOUNTER — TELEPHONE (OUTPATIENT)
Dept: CARDIOLOGY | Age: 28
End: 2023-09-27

## 2023-09-27 PROCEDURE — 93227 XTRNL ECG REC<48 HR R&I: CPT | Performed by: PEDIATRICS

## 2024-03-18 ENCOUNTER — CLINICAL ABSTRACT (OUTPATIENT)
Dept: CARE COORDINATION | Age: 29
End: 2024-03-18

## 2024-06-27 ENCOUNTER — TELEPHONE (OUTPATIENT)
Dept: PEDIATRIC CARDIOLOGY | Age: 29
End: 2024-06-27

## 2024-07-05 ENCOUNTER — APPOINTMENT (OUTPATIENT)
Dept: PEDIATRIC CARDIOLOGY | Age: 29
End: 2024-07-05

## 2024-07-05 ENCOUNTER — ANCILLARY PROCEDURE (OUTPATIENT)
Dept: PEDIATRIC CARDIOLOGY | Age: 29
End: 2024-07-05
Attending: PEDIATRICS

## 2024-07-05 VITALS
DIASTOLIC BLOOD PRESSURE: 97 MMHG | BODY MASS INDEX: 30.05 KG/M2 | WEIGHT: 176 LBS | OXYGEN SATURATION: 98 % | SYSTOLIC BLOOD PRESSURE: 141 MMHG | HEIGHT: 64 IN | HEART RATE: 72 BPM

## 2024-07-05 DIAGNOSIS — Z82.49 FAMILY HISTORY OF ARRHYTHMOGENIC RIGHT VENTRICULAR CARDIOMYOPATHY: ICD-10-CM

## 2024-07-05 DIAGNOSIS — Z82.49 FAMILY HISTORY OF ARRHYTHMOGENIC RIGHT VENTRICULAR CARDIOMYOPATHY: Primary | ICD-10-CM

## 2024-07-05 LAB
BSA FOR PED ECHO PROCEDURE: 1.92 M2
FRACTIONAL SHORTENING MMODE: 34 %
IVSS (M-MODE): 0.96 CM
LEFT VENTRICLE EJECTION FRACTION BY TEICHOLZ M-MODE (%): 64 %
LEFT VENTRICULAR POSTERIOR WALL IN END DIASTOLE MMODE: 0.74 CM (ref 0.51–0.97)
LEFT VENTRICULAR POSTERIOR WALL SYSTOLE MMODE: 1.1 CM
LV END-DIASTOLIC ENDOCARDIAL DIAMETER MMODE: 3.9 CM (ref 4.34–6.1)
LV END-DIASTOLIC SEPTAL THICKNESS MMODE: 0.74 CM (ref 0.52–0.98)
LVIDS BY MMODE: 2.58 CM
RIGHT VENTRICULAR END DIASTOLIC DIAS: 1.73 CM
Z SCORE OF LEFT VENTRICULAR POSTERIOR WALL IN END DIASTOLE MMODE: 0 CM
Z SCORE OF LV END-DIASTOLIC ENDOCARDIAL DIAMETER MMODE: -3 CM
Z SCORE OF LV END-DIASTOLIC SEPTAL THICKNESS MMODE: -0.1 CM

## 2024-08-14 ENCOUNTER — TELEPHONE (OUTPATIENT)
Dept: PEDIATRIC CARDIOLOGY | Age: 29
End: 2024-08-14

## 2025-09-05 ENCOUNTER — APPOINTMENT (OUTPATIENT)
Dept: PEDIATRIC CARDIOLOGY | Age: 30
End: 2025-09-05

## 2025-09-05 LAB
AORTIC ROOT: 2.8 CM (ref 2.38–3.38)
AORTIC VALVE ANNULUS: 2 CM (ref 1.68–2.46)
ASCENDING AORTA: 2.5 CM (ref 2.01–3.01)
BSA FOR PED ECHO PROCEDURE: 1.96 M2
FRACTIONAL SHORTENING: 45 %
LEFT VENTRICLE LONGITUDINAL STRAIN BY ENDOCARDIAL GLS A2C (%): -20 %
LEFT VENTRICLE LONGITUDINAL STRAIN BY ENDOCARDIAL GLS A3C (%): -21.7 %
LEFT VENTRICLE LONGITUDINAL STRAIN BY ENDOCARDIAL GLS A4C (%): -22.1 %
LEFT VENTRICLE LONGITUDINAL STRAIN BY ENDOCARDIAL GLS AVERAGE (%): -21.3 %
LEFT VENTRICULAR POSTERIOR WALL IN END DIASTOLE (LVPW): 0.71 CM (ref 0.52–0.98)
LV EF BIPLANE MOD: 69 %
LV EF US.2D.A2C+ESTIMATED: 71 %
LV EF US.2D.A4C+ESTIMATED: 69 %
LV SHORT-AXIS END-DIASTOLIC ENDOCARDIAL DIAMETER: 4.21 CM (ref 4.38–6.15)
LV SHORT-AXIS END-DIASTOLIC SEPTAL THICKNESS: 0.71 CM (ref 0.53–0.99)
LV SHORT-AXIS END-SYSTOLIC ENDOCARDIAL DIAMETER: 2.32 CM
LV THICKNESS:DIMENSION RATIO: 0.17 CM (ref 0.09–0.21)
SINOTUBULAR JUNCTION: 2.5 CM (ref 1.92–2.81)
Z SCORE OF AORTIC VALVE ANNULUS PHN: -0.4
Z SCORE OF LEFT VENTRICULAR POSTERIOR WALL IN END DIASTOLE: -0.3 CM
Z SCORE OF LV SHORT-AXIS END-DIASTOLIC ENDOCARDIAL DIAMETER: -2.4 CM
Z SCORE OF LV SHORT-AXIS END-DIASTOLIC SEPTAL THICKNESS: -0.4 CM
Z SCORE OF LV THICKNESS:DIMENSION RATIO: 0.6
Z-SCORE OF AORTIC ROOT: -0.3
Z-SCORE OF ASCENDING AORTA: 0
Z-SCORE OF SINOTUBULAR JUNCTION PHN: 0.6

## (undated) NOTE — MR AVS SNAPSHOT
[address] [address] [phone]               Thank you for choosing us for your health care visit with Antonia Jerome DO.   We are glad to serve you and happy to provide you with th Now link in the [employer]. Enter your [employer] Activation Code exactly as it appears below along with your Zip Code and Date of Birth to complete the sign-up process. If you do not sign up before the expiration date, you must request a new code.     Ernestina Andrews priority on exercise in your life                    Visit [facility] online at  [email or web address]